# Patient Record
Sex: FEMALE | Race: WHITE | Employment: FULL TIME | URBAN - METROPOLITAN AREA
[De-identification: names, ages, dates, MRNs, and addresses within clinical notes are randomized per-mention and may not be internally consistent; named-entity substitution may affect disease eponyms.]

---

## 2019-10-02 ENCOUNTER — TELEPHONE (OUTPATIENT)
Dept: GASTROENTEROLOGY | Age: 59
End: 2019-10-02

## 2019-10-09 DIAGNOSIS — Z86.010 HX OF COLONIC POLYPS: Primary | ICD-10-CM

## 2019-10-09 RX ORDER — SODIUM, POTASSIUM,MAG SULFATES 17.5-3.13G
2 SOLUTION, RECONSTITUTED, ORAL ORAL ONCE
Qty: 1 BOTTLE | Refills: 0 | Status: SHIPPED | OUTPATIENT
Start: 2019-10-09 | End: 2019-10-09

## 2019-11-01 ENCOUNTER — TELEPHONE (OUTPATIENT)
Dept: GASTROENTEROLOGY | Age: 59
End: 2019-11-01

## 2020-01-03 ENCOUNTER — TELEPHONE (OUTPATIENT)
Dept: GASTROENTEROLOGY | Age: 60
End: 2020-01-03

## 2020-01-03 NOTE — TELEPHONE ENCOUNTER
Writer left msg on pt's vm to call back & confirm she will be here for colon proc sched w/ [place] at [id number] 1/7/20 @ 8am arrival time. Also confirm pt will have a  & ask if she has any questions regarding her bowel prep instructions.

## 2020-01-30 NOTE — TELEPHONE ENCOUNTER
Patient called, she no longer has any sick days for work, patient is scheduled at [facility] for 3/3/2020, and needs to reschedule to June, when she will be out of school.    Patient has now been rescheduled for [id number] [facility] Tuesday 6/9/20 @8:15am w/arrival at 6:45am  Patient has bowel prep, and will up date her bowel prep Instructions

## 2020-05-30 ENCOUNTER — TELEPHONE (OUTPATIENT)
Dept: GASTROENTEROLOGY | Age: 60
End: 2020-05-30

## 2022-11-08 ENCOUNTER — HOSPITAL ENCOUNTER (OUTPATIENT)
Dept: MAMMOGRAPHY | Age: 62
Discharge: HOME | End: 2022-11-10
Payer: COMMERCIAL

## 2022-11-08 DIAGNOSIS — Z12.31 BREAST CANCER SCREENING BY MAMMOGRAM: ICD-10-CM

## 2022-11-08 PROCEDURE — 77067 SCR MAMMO BI INCL CAD: CPT
